# Patient Record
Sex: MALE | Race: WHITE | URBAN - METROPOLITAN AREA
[De-identification: names, ages, dates, MRNs, and addresses within clinical notes are randomized per-mention and may not be internally consistent; named-entity substitution may affect disease eponyms.]

---

## 2018-09-23 ENCOUNTER — EMERGENCY (EMERGENCY)
Facility: HOSPITAL | Age: 43
LOS: 1 days | Discharge: HOME | End: 2018-09-23
Admitting: EMERGENCY MEDICINE
Payer: COMMERCIAL

## 2018-09-23 VITALS
SYSTOLIC BLOOD PRESSURE: 146 MMHG | RESPIRATION RATE: 17 BRPM | HEIGHT: 69 IN | TEMPERATURE: 100 F | HEART RATE: 86 BPM | DIASTOLIC BLOOD PRESSURE: 96 MMHG | OXYGEN SATURATION: 100 % | WEIGHT: 199.96 LBS

## 2018-09-23 LAB
APPEARANCE UR: CLEAR — SIGNIFICANT CHANGE UP
BILIRUB UR-MCNC: NEGATIVE — SIGNIFICANT CHANGE UP
COLOR SPEC: YELLOW — SIGNIFICANT CHANGE UP
DIFF PNL FLD: ABNORMAL
GLUCOSE UR QL: NEGATIVE — SIGNIFICANT CHANGE UP
HIV 1 & 2 AB SERPL IA.RAPID: SIGNIFICANT CHANGE UP
KETONES UR-MCNC: NEGATIVE — SIGNIFICANT CHANGE UP
LEUKOCYTE ESTERASE UR-ACNC: NEGATIVE — SIGNIFICANT CHANGE UP
NITRITE UR-MCNC: NEGATIVE — SIGNIFICANT CHANGE UP
PH UR: 6.5 — SIGNIFICANT CHANGE UP (ref 5–8)
PROT UR-MCNC: NEGATIVE MG/DL — SIGNIFICANT CHANGE UP
SP GR SPEC: 1.01 — SIGNIFICANT CHANGE UP (ref 1–1.03)
UROBILINOGEN FLD QL: 0.2 E.U./DL — SIGNIFICANT CHANGE UP

## 2018-09-23 PROCEDURE — 99283 EMERGENCY DEPT VISIT LOW MDM: CPT

## 2018-09-23 NOTE — ED PROVIDER NOTE - OBJECTIVE STATEMENT
44 y/o M with no known significant PMH presents to the ED stating he thinks he was exposed to HIV 5 days ago. He reports having sex with 3 male prostitutes in which he received both anal and oral sex. He states that they were wearing condoms but thinks they may have cut the tips of the condoms off and ejaculated inside him. He states they had given him GHB so we was not keenly alert and oriented, however he denies losing consciousness. He states that the room appeared to be "soundproofed" and there was lots of "computer and IT equipment everywhere" which he thought was odd at the time. After mentioning this to someone else that he knows, he was told that he was likely the victim of a "seroconversion video" in which victims are drugged with HIV and purposely infected with HIV on video which is sold on the black market for a large profit. He states that he has now been experiencing subjective fever, chills and bodyaches for the past 2 days and is concerned he was infected with HIV. He states that he has taken PREP in the past but has not taken PREP within the last month. He is adamantly refusing to have [facility] contacted and does not want a police report filed. He denies having any Hx of STD in the past and denies having any other sx's at this time.    Denies abdominal pain, N/V/D, dysuria, hematuria, penile discharge, testicular pain, scrotal swelling, genital lesions or rash 44 y/o M with no known significant PMH presents to the ED stating he thinks he was exposed to HIV 4 days ago. He reports having sex with 3 male prostitutes in which he received both anal and oral sex. He states that they were wearing condoms but thinks they may have cut the tips of the condoms off and ejaculated inside him. He states they had given him GHB so we was not keenly alert and oriented, however he denies losing consciousness. He states that the room appeared to be "soundproofed" and there was lots of "computer and IT equipment everywhere" which he thought was odd at the time. After mentioning this to someone else that he knows, he was told that he was likely the victim of a "seroconversion video" in which victims are drugged with HIV and purposely infected with HIV on video which is sold on the black market for a large profit. He states that he has now been experiencing subjective fever, chills and bodyaches for the past 2 days and is concerned he was infected with HIV. He states that he has taken PREP in the past but has not taken PREP within the last month. He is adamantly refusing to have [facility] contacted and does not want a police report filed at this time. He states he will F/U with [facility] tomorrow. He denies having any Hx of STD in the past and denies having any other sx's at this time.    Denies abdominal pain, N/V/D, dysuria, hematuria, penile discharge, testicular pain, scrotal swelling, genital lesions or rash

## 2018-09-23 NOTE — ED ADULT TRIAGE NOTE - CHIEF COMPLAINT QUOTE
ambulates into ed complaining of being exposed to hiv 4 days ago.  Now seeks post exposure prophylaxis.

## 2018-09-23 NOTE — ED PROVIDER NOTE - MEDICAL DECISION MAKING DETAILS
Dr. Malave (Infectious Disease) consulted who states too much time has passed since possible exposure and pt no longer qualifies for PEP. He recommends having the patient follow up with HIV/Retroviral clinic in 4 weeks for repeat HIV testing and reassessment. Rapid HIV is negative here. Chlmaydia/GC and Syphilis labs pending. Pt refused empiric tx. He agrees to F/U with his PMD this week and the HIV/Retroviral clinic in 4 weeks as recommended by ID. Strict return precautions reviewed with pt in which pt verbalizes understanding and agrees to.

## 2018-09-24 LAB — T PALLIDUM AB TITR SER: NEGATIVE — SIGNIFICANT CHANGE UP

## 2018-09-25 LAB
C TRACH RRNA SPEC QL NAA+PROBE: SIGNIFICANT CHANGE UP
CULTURE RESULTS: NO GROWTH — SIGNIFICANT CHANGE UP
N GONORRHOEA RRNA SPEC QL NAA+PROBE: SIGNIFICANT CHANGE UP
SPECIMEN SOURCE: SIGNIFICANT CHANGE UP
SPECIMEN SOURCE: SIGNIFICANT CHANGE UP

## 2018-09-27 DIAGNOSIS — Z20.6 CONTACT WITH AND (SUSPECTED) EXPOSURE TO HUMAN IMMUNODEFICIENCY VIRUS [HIV]: ICD-10-CM

## 2018-09-27 DIAGNOSIS — R50.9 FEVER, UNSPECIFIED: ICD-10-CM

## 2020-12-28 NOTE — ED ADULT NURSE NOTE - NSIMPLEMENTINTERV_GEN_ALL_ED
CDC Testing and Quarantine Guidelines for patients with exposure to a known-positive COVID-19 person:  -A close exposure is defined as anyone who has had an exposure (masked or unmasked) to a known COVID -19 positive person within 6 ft for longer than 15 minutes. If your exposure meets this definition you are required by CDC guidelines to quarantine for at least 7-10 days from time of exposure. The CDC states that a test can be performed for an asymptomatic patient (someone who does not have any symptoms) after a close exposure, and that a test should be done if you develop symptoms after a close exposure as described above.  Specifically, you can test at day 5 or later if asymptomatic, in order to get released from quarantine on day 7 or later.  -If you develop symptoms sooner, you should test when your symptoms start.  -If you meet the definition of a close exposure, it will not matter whether you are experiencing symptoms- a quarantine for at least 7-10 days after a close exposure is required by CDC guidelines.  -Please note, if you decide to test as an asymptomatic during your quarantine and you are positive, you will be restarting your quarantine and moving from a possible 10 day quarantine (if you do not test), to a 11 day or greater quarantine.  -The CDC also suggests people still monitor for symptoms for a full 14 days and remember that the shorter quarantine options do not replace initial CDC guidance.  The CDC continues to recommend quarantining for 14 days as the best way to reduce risk for spreading COVID-19 - however, this is only a recommendation.  -If your exposure does not meet the above definition, you can return to your normal daily activities to include social distancing, wearing a mask and frequent handwashing.       Implemented All Universal Safety Interventions:  [place] to call system. Call bell, personal items and telephone within reach. Instruct patient to call for assistance. Room bathroom lighting operational. Non-slip footwear when patient is off stretcher. Physically safe environment: no spills, clutter or unnecessary equipment. Stretcher in lowest position, wheels locked, appropriate side rails in place.

## 2022-01-01 NOTE — ED ADULT NURSE NOTE - CARDIO WDL
Normal rate, regular rhythm, normal S1, S2 heart sounds heard. Site: Sternum (13 Apr 2022 02:19)  Bilirubin: 2.3 (13 Apr 2022 02:19)

## 2023-08-08 NOTE — ED ADULT TRIAGE NOTE - WEIGHT METHOD
stated [facility]: I discussed with the patient the risks of ixekizumab including but not limited to immunosuppression, serious infections, worsening of inflammatory bowel disease and drug reactions.  The patient understands that monitoring is required including a PPD at baseline and must alert us or the primary physician if symptoms of infection or other concerning signs are noted.